# Patient Record
Sex: FEMALE
[De-identification: names, ages, dates, MRNs, and addresses within clinical notes are randomized per-mention and may not be internally consistent; named-entity substitution may affect disease eponyms.]

---

## 2020-12-07 PROBLEM — Z00.00 ENCOUNTER FOR PREVENTIVE HEALTH EXAMINATION: Status: ACTIVE | Noted: 2020-12-07

## 2020-12-08 ENCOUNTER — APPOINTMENT (OUTPATIENT)
Dept: OTOLARYNGOLOGY | Facility: CLINIC | Age: 70
End: 2020-12-08
Payer: MEDICARE

## 2020-12-08 VITALS
SYSTOLIC BLOOD PRESSURE: 159 MMHG | HEIGHT: 63 IN | WEIGHT: 180 LBS | HEART RATE: 93 BPM | DIASTOLIC BLOOD PRESSURE: 89 MMHG | TEMPERATURE: 97.3 F | BODY MASS INDEX: 31.89 KG/M2

## 2020-12-08 PROCEDURE — 69210 REMOVE IMPACTED EAR WAX UNI: CPT

## 2020-12-08 PROCEDURE — 99072 ADDL SUPL MATRL&STAF TM PHE: CPT

## 2020-12-08 PROCEDURE — 99204 OFFICE O/P NEW MOD 45 MIN: CPT | Mod: 25

## 2020-12-08 NOTE — PHYSICAL EXAM
[de-identified] : Bilateral soft tissue ear canal stenosis, worse on left side, ceruminous debris medially on the left side, was only able to perform partial removal [de-identified] : Visible portion of bilateral tympanic membranes intact without effusion or retraction [Normal] : no rashes

## 2020-12-08 NOTE — REASON FOR VISIT
[Initial Evaluation] : an initial evaluation for [FreeTextEntry2] : Clogged ear, c/o of recurrent both ear infection L>R last L ear infection was in september/2020

## 2020-12-08 NOTE — CONSULT LETTER
[FreeTextEntry2] : Rafita Bauer, DO [FreeTextEntry1] : Dear Rafita,\par \par Thanks for referring Shyann Farris for evaluation of her bilateral ear canal stenosis.  Exam today shows bilateral soft tissue EAC stenosis, worse on the left side, with a 2 to 3 mm opening on the left side preventing removal of ceruminous debris accumulation in the medial ear canal.  I discussed with her that most likely in order to correct this problem she would require canalplasty with surgical removal of the soft tissue stenosis and skin grafting, but first I did offer her the option of attempting further medical management in the short-term.   Today after cleaning the left ear canal we applied topical betamethasone cream.   At her next follow-up visit I may also consider placement of a wick to stent the canal open further.  I have also occasionally found topical steroid powders to be helpful in maintaining patency of the canal, although in this circumstance the degree of stenosis may already be too severe.   We I will see her back in 1 week.\par \par Thank you once again for the opportunity to participate in your patient's care, and I will keep you informed as to his progress.\par \par Best regards,\par \par Baltazar Melton MD\par Otology/Neurotology\par [facility]\par [facility]

## 2020-12-08 NOTE — HISTORY OF PRESENT ILLNESS
[de-identified] : 70-year-old woman with long history of bilateral otitis externa presents with recurring clogged ear sensation with associated hearing loss, bilateral but worse in the left ear.  Some improvement with antibiotic/steroid drops.  Symptoms present over years, but more recently worsened in past year.  Not diabetic, no problems with eczema or psoriasis in other parts of the body.

## 2020-12-08 NOTE — PROCEDURE
[FreeTextEntry3] : Procedure note:  Bilateral cerumenectomy\par \par Description of Procedure:  Cerumen impaction was noted requiring debridement under the operating microscope using otologic instrumentation.  This procedure was repeated in the contralateral ear.  The patient tolerated the procedure without complications.

## 2020-12-08 NOTE — DATA REVIEWED
[de-identified] : I personally reviewed temporal bone CT images and the report, which shows soft tissue thickening of bilateral ear canal epithelium, bilateral middle ear and mastoid clear.

## 2020-12-18 ENCOUNTER — APPOINTMENT (OUTPATIENT)
Dept: OTOLARYNGOLOGY | Facility: CLINIC | Age: 70
End: 2020-12-18
Payer: MEDICARE

## 2020-12-18 ENCOUNTER — TRANSCRIPTION ENCOUNTER (OUTPATIENT)
Age: 70
End: 2020-12-18

## 2020-12-18 VITALS
HEIGHT: 63 IN | WEIGHT: 180 LBS | DIASTOLIC BLOOD PRESSURE: 96 MMHG | SYSTOLIC BLOOD PRESSURE: 170 MMHG | BODY MASS INDEX: 31.89 KG/M2 | HEART RATE: 87 BPM

## 2020-12-18 PROCEDURE — 99213 OFFICE O/P EST LOW 20 MIN: CPT | Mod: 25

## 2020-12-18 PROCEDURE — 99072 ADDL SUPL MATRL&STAF TM PHE: CPT

## 2020-12-18 PROCEDURE — 69210 REMOVE IMPACTED EAR WAX UNI: CPT

## 2020-12-18 NOTE — REASON FOR VISIT
[Subsequent Evaluation] : a subsequent evaluation for [FreeTextEntry2] : follow up clogged ear, c/o of recurrent both ear infection L>R last L ear infection was in september/2020.

## 2020-12-18 NOTE — HISTORY OF PRESENT ILLNESS
[de-identified] : 70-year-old woman follow up for clogged ears. Reports has itch in left ear for 2 days. Denies otalgia, otorrhea, ear infections, hearing loss, tinnitus, dizziness, vertigo, headaches related to hearing.

## 2020-12-18 NOTE — PROCEDURE
[FreeTextEntry3] : Procedure note:  Left cerumenectomy\par \par Description of Procedure:  Cerumen impaction was noted requiring debridement under the operating microscope using otologic instrumentation.  The patient tolerated the procedure without complications.\par

## 2020-12-18 NOTE — PHYSICAL EXAM
[de-identified] : Bilateral soft tissue ear canal stenosis, worse on left side, slightly improved compared to last evaluation ceruminous debris medially on the left side [de-identified] : Visible portion of bilateral tympanic membranes intact without effusion or retraction

## 2021-01-22 ENCOUNTER — APPOINTMENT (OUTPATIENT)
Dept: OTOLARYNGOLOGY | Facility: CLINIC | Age: 71
End: 2021-01-22
Payer: MEDICARE

## 2021-01-22 PROCEDURE — 99072 ADDL SUPL MATRL&STAF TM PHE: CPT

## 2021-01-22 PROCEDURE — 99213 OFFICE O/P EST LOW 20 MIN: CPT | Mod: 25

## 2021-01-22 PROCEDURE — 69210 REMOVE IMPACTED EAR WAX UNI: CPT

## 2021-01-23 NOTE — HISTORY OF PRESENT ILLNESS
[de-identified] : f/u Left OE with stenosis of EAC\par betamethasone and cotton placed last visit\par here for possible wick placement today\par left ear feels clogged, wet

## 2021-01-23 NOTE — CONSULT LETTER
[FreeTextEntry2] : Rafita Bauer, DO [FreeTextEntry1] : Dear Rafita,\par \par Shyann Farris presents for follow-up for her bilateral ear canal stenosis.  Since her last visit with me slightly more than 1 month ago, she has not had significant drainage following application of topical steroid powderw and betamethasone cream.  Her exam today shows persistent bilateral soft tissue stenosis, greater on the left side, but overall improved relative to her initial evaluation.  I recommend we attempt dilation of the stenosis on the left side with an ear wick, and this was performed today.  She will follow-up in 1 week for weight removal, ear cleaning, and replacement.  As previously discussed, I agree that she would benefit from surgical resection of the soft tissue stenosis, but she would prefer to avoid surgery if at all possible so we will continue with these conservative measures in the short-term.\par \par Thank you once again for the opportunity to participate in your patient's care, and I will keep you informed as to her progress.\par \par Best regards,\par \par Baltazar Melton MD\par Otology/Neurotology\par [facility]\par [facility]

## 2021-01-23 NOTE — PHYSICAL EXAM
[de-identified] : Bilateral soft tissue ear canal stenosis, worse on left side, slightly improved compared to last evaluation, ceruminous debris medially on the left side [de-identified] : Visible portion of bilateral tympanic membranes intact without effusion or retraction

## 2021-01-29 ENCOUNTER — APPOINTMENT (OUTPATIENT)
Dept: OTOLARYNGOLOGY | Facility: CLINIC | Age: 71
End: 2021-01-29
Payer: MEDICARE

## 2021-01-29 PROCEDURE — 99072 ADDL SUPL MATRL&STAF TM PHE: CPT

## 2021-01-29 PROCEDURE — 92504 EAR MICROSCOPY EXAMINATION: CPT

## 2021-01-29 PROCEDURE — 99213 OFFICE O/P EST LOW 20 MIN: CPT | Mod: 25

## 2021-01-30 NOTE — CONSULT LETTER
[FreeTextEntry2] : Rafita Bauer, DO [FreeTextEntry1] : Dear Rafita,\par \par Shyann Farris presents for follow-up for her bilateral ear canal stenosis.  At her last visit she underwent left ear canal dilation with a wick.  Exam today shows patent bilateral ear canals which are dry and without ceruminous debris accumulation.  There is an approximately 4 mm opening bilaterally.  We reapplied topical steroid powder to both ear canals today.  At this point she is pleased with the absence of drainage and short-term hearing improvement.  I would still like to monitor her closely, and we discussed that stenoses tend to recur and worsen over time.  For now we will see her back in 6 weeks.\par \par Thank you once again for the opportunity to participate in your patient's care, and I will keep you informed as to her progress.\par \par Best regards,\par \par Baltazar Melton MD\par Otology/Neurotology\par [facility]\par [facility]

## 2021-01-30 NOTE — HISTORY OF PRESENT ILLNESS
[de-identified] : 70 year female follow up b/l OE. Wick placement in left year last visit, reports fell out last night. Reports right ear still feels clogged, left ear feels better. Denies otalgia, otorrhea, ear infections, hearing loss.

## 2021-01-30 NOTE — PROCEDURE
[FreeTextEntry3] : Procedure note:  Binocular microscopy\par \par Inspection of the ears was performed under binocular microscopy because of need to accurately visualize otologic landmarks and potential pathologic conditions that would not otherwise be visible through simple otoscopic exam.
N/A

## 2021-02-26 ENCOUNTER — APPOINTMENT (OUTPATIENT)
Dept: OTOLARYNGOLOGY | Facility: CLINIC | Age: 71
End: 2021-02-26
Payer: MEDICARE

## 2021-02-26 VITALS — SYSTOLIC BLOOD PRESSURE: 150 MMHG | HEART RATE: 89 BPM | DIASTOLIC BLOOD PRESSURE: 88 MMHG | HEIGHT: 63 IN

## 2021-02-26 DIAGNOSIS — H93.293 OTHER ABNORMAL AUDITORY PERCEPTIONS, BILATERAL: ICD-10-CM

## 2021-02-26 DIAGNOSIS — H61.21 IMPACTED CERUMEN, RIGHT EAR: ICD-10-CM

## 2021-02-26 DIAGNOSIS — H61.309 ACQUIRED STENOSIS OF EXTERNAL EAR CANAL, UNSPECIFIED, UNSPECIFIED EAR: ICD-10-CM

## 2021-02-26 DIAGNOSIS — H61.22 IMPACTED CERUMEN, LEFT EAR: ICD-10-CM

## 2021-02-26 PROCEDURE — 99072 ADDL SUPL MATRL&STAF TM PHE: CPT

## 2021-02-26 PROCEDURE — 69210 REMOVE IMPACTED EAR WAX UNI: CPT

## 2021-02-26 PROCEDURE — 99213 OFFICE O/P EST LOW 20 MIN: CPT | Mod: 25

## 2021-02-26 RX ORDER — AMLODIPINE BESYLATE 5 MG/1
TABLET ORAL
Refills: 0 | Status: ACTIVE | COMMUNITY

## 2021-02-26 RX ORDER — OFLOXACIN OTIC 3 MG/ML
0.3 SOLUTION AURICULAR (OTIC)
Qty: 1 | Refills: 1 | Status: COMPLETED | COMMUNITY
Start: 2021-01-22 | End: 2021-02-26

## 2021-02-26 NOTE — HISTORY OF PRESENT ILLNESS
[de-identified] : 70 year female follow up b/l OE. Topical steroid applied in both ears last visit. States ears feel much better. Hearing has improved. Denies otalgia, otorrhea, ear infections.

## 2021-04-23 ENCOUNTER — APPOINTMENT (OUTPATIENT)
Dept: OTOLARYNGOLOGY | Facility: CLINIC | Age: 71
End: 2021-04-23

## 2023-08-28 ENCOUNTER — HOSPITAL ENCOUNTER (EMERGENCY)
Age: 73
Discharge: HOME | End: 2023-08-29
Attending: EMERGENCY MEDICINE
Payer: MEDICARE

## 2023-08-28 ENCOUNTER — APPOINTMENT (OUTPATIENT)
Dept: GENERAL RADIOLOGY | Age: 73
End: 2023-08-28
Payer: MEDICARE

## 2023-08-28 DIAGNOSIS — S73.005A CLOSED DISLOCATION OF LEFT HIP, INITIAL ENCOUNTER (HCC): Primary | ICD-10-CM

## 2023-08-28 PROCEDURE — 96375 TX/PRO/DX INJ NEW DRUG ADDON: CPT

## 2023-08-28 PROCEDURE — 99285 EMERGENCY DEPT VISIT HI MDM: CPT

## 2023-08-28 PROCEDURE — 96374 THER/PROPH/DIAG INJ IV PUSH: CPT

## 2023-08-28 PROCEDURE — 96376 TX/PRO/DX INJ SAME DRUG ADON: CPT

## 2023-08-28 PROCEDURE — 73502 X-RAY EXAM HIP UNI 2-3 VIEWS: CPT

## 2023-08-28 ASSESSMENT — PAIN - FUNCTIONAL ASSESSMENT: PAIN_FUNCTIONAL_ASSESSMENT: 0-10

## 2023-08-28 ASSESSMENT — PAIN DESCRIPTION - ORIENTATION: ORIENTATION: LEFT

## 2023-08-28 ASSESSMENT — PAIN DESCRIPTION - LOCATION: LOCATION: HIP

## 2023-08-28 ASSESSMENT — PAIN SCALES - GENERAL: PAINLEVEL_OUTOF10: 9

## 2023-08-29 ENCOUNTER — APPOINTMENT (OUTPATIENT)
Dept: GENERAL RADIOLOGY | Age: 73
End: 2023-08-29
Payer: MEDICARE

## 2023-08-29 ENCOUNTER — ANESTHESIA (OUTPATIENT)
Dept: SURGERY | Age: 73
End: 2023-08-29
Payer: MEDICARE

## 2023-08-29 ENCOUNTER — ANESTHESIA EVENT (OUTPATIENT)
Dept: SURGERY | Age: 73
End: 2023-08-29
Payer: MEDICARE

## 2023-08-29 VITALS
HEIGHT: 63 IN | RESPIRATION RATE: 14 BRPM | SYSTOLIC BLOOD PRESSURE: 179 MMHG | WEIGHT: 191 LBS | DIASTOLIC BLOOD PRESSURE: 75 MMHG | TEMPERATURE: 97.6 F | HEART RATE: 68 BPM | BODY MASS INDEX: 33.84 KG/M2 | OXYGEN SATURATION: 95 %

## 2023-08-29 PROCEDURE — 2500000003 HC RX 250 WO HCPCS: Performed by: NURSE ANESTHETIST, CERTIFIED REGISTERED

## 2023-08-29 PROCEDURE — 7100000011 HC PHASE II RECOVERY - ADDTL 15 MIN: Performed by: ORTHOPAEDIC SURGERY

## 2023-08-29 PROCEDURE — 96376 TX/PRO/DX INJ SAME DRUG ADON: CPT

## 2023-08-29 PROCEDURE — 3700000001 HC ADD 15 MINUTES (ANESTHESIA): Performed by: ORTHOPAEDIC SURGERY

## 2023-08-29 PROCEDURE — 7100000001 HC PACU RECOVERY - ADDTL 15 MIN: Performed by: ORTHOPAEDIC SURGERY

## 2023-08-29 PROCEDURE — 73502 X-RAY EXAM HIP UNI 2-3 VIEWS: CPT

## 2023-08-29 PROCEDURE — L1830 KO IMMOB CANVAS LONG PRE OTS: HCPCS | Performed by: ORTHOPAEDIC SURGERY

## 2023-08-29 PROCEDURE — 73501 X-RAY EXAM HIP UNI 1 VIEW: CPT

## 2023-08-29 PROCEDURE — 7100000000 HC PACU RECOVERY - FIRST 15 MIN: Performed by: ORTHOPAEDIC SURGERY

## 2023-08-29 PROCEDURE — 6360000002 HC RX W HCPCS: Performed by: NURSE ANESTHETIST, CERTIFIED REGISTERED

## 2023-08-29 PROCEDURE — 6360000002 HC RX W HCPCS: Performed by: EMERGENCY MEDICINE

## 2023-08-29 PROCEDURE — 3600000012 HC SURGERY LEVEL 2 ADDTL 15MIN: Performed by: ORTHOPAEDIC SURGERY

## 2023-08-29 PROCEDURE — 96375 TX/PRO/DX INJ NEW DRUG ADDON: CPT

## 2023-08-29 PROCEDURE — 7100000010 HC PHASE II RECOVERY - FIRST 15 MIN: Performed by: ORTHOPAEDIC SURGERY

## 2023-08-29 PROCEDURE — 3700000000 HC ANESTHESIA ATTENDED CARE: Performed by: ORTHOPAEDIC SURGERY

## 2023-08-29 PROCEDURE — 2580000003 HC RX 258: Performed by: NURSE ANESTHETIST, CERTIFIED REGISTERED

## 2023-08-29 PROCEDURE — 3600000002 HC SURGERY LEVEL 2 BASE: Performed by: ORTHOPAEDIC SURGERY

## 2023-08-29 PROCEDURE — 96374 THER/PROPH/DIAG INJ IV PUSH: CPT

## 2023-08-29 PROCEDURE — 6370000000 HC RX 637 (ALT 250 FOR IP): Performed by: ANESTHESIOLOGY

## 2023-08-29 RX ORDER — HYDROCODONE BITARTRATE AND ACETAMINOPHEN 7.5; 325 MG/1; MG/1
1 TABLET ORAL EVERY 6 HOURS PRN
Qty: 20 TABLET | Refills: 0 | Status: SHIPPED | OUTPATIENT
Start: 2023-08-29 | End: 2023-09-03

## 2023-08-29 RX ORDER — MORPHINE SULFATE 4 MG/ML
4 INJECTION INTRAVENOUS ONCE
Status: COMPLETED | OUTPATIENT
Start: 2023-08-29 | End: 2023-08-29

## 2023-08-29 RX ORDER — METOPROLOL SUCCINATE 25 MG/1
25 TABLET, EXTENDED RELEASE ORAL DAILY
Qty: 30 TABLET | Refills: 5 | COMMUNITY
Start: 2023-08-01 | End: 2024-01-28

## 2023-08-29 RX ORDER — METOPROLOL SUCCINATE 25 MG/1
25 TABLET, EXTENDED RELEASE ORAL ONCE
Status: COMPLETED | OUTPATIENT
Start: 2023-08-29 | End: 2023-08-29

## 2023-08-29 RX ORDER — LIDOCAINE HYDROCHLORIDE 20 MG/ML
INJECTION, SOLUTION EPIDURAL; INFILTRATION; INTRACAUDAL; PERINEURAL PRN
Status: DISCONTINUED | OUTPATIENT
Start: 2023-08-29 | End: 2023-08-29 | Stop reason: SDUPTHER

## 2023-08-29 RX ORDER — MELOXICAM 15 MG/1
15 TABLET ORAL EVERY MORNING
COMMUNITY
Start: 2023-07-24

## 2023-08-29 RX ORDER — SODIUM CHLORIDE, SODIUM LACTATE, POTASSIUM CHLORIDE, CALCIUM CHLORIDE 600; 310; 30; 20 MG/100ML; MG/100ML; MG/100ML; MG/100ML
INJECTION, SOLUTION INTRAVENOUS CONTINUOUS PRN
Status: DISCONTINUED | OUTPATIENT
Start: 2023-08-29 | End: 2023-08-29 | Stop reason: SDUPTHER

## 2023-08-29 RX ORDER — LEFLUNOMIDE 20 MG/1
20 TABLET ORAL EVERY MORNING
COMMUNITY
Start: 2023-08-16

## 2023-08-29 RX ORDER — MORPHINE SULFATE 4 MG/ML
4 INJECTION INTRAVENOUS
Status: DISCONTINUED | OUTPATIENT
Start: 2023-08-29 | End: 2023-08-29 | Stop reason: HOSPADM

## 2023-08-29 RX ORDER — ONDANSETRON 2 MG/ML
4 INJECTION INTRAMUSCULAR; INTRAVENOUS
Status: COMPLETED | OUTPATIENT
Start: 2023-08-29 | End: 2023-08-29

## 2023-08-29 RX ORDER — AMLODIPINE AND OLMESARTAN MEDOXOMIL 10; 40 MG/1; MG/1
1 TABLET ORAL EVERY MORNING
COMMUNITY
Start: 2023-06-07

## 2023-08-29 RX ORDER — PROPOFOL 10 MG/ML
INJECTION, EMULSION INTRAVENOUS PRN
Status: DISCONTINUED | OUTPATIENT
Start: 2023-08-29 | End: 2023-08-29 | Stop reason: SDUPTHER

## 2023-08-29 RX ADMIN — MORPHINE SULFATE 4 MG: 4 INJECTION INTRAVENOUS at 01:25

## 2023-08-29 RX ADMIN — MORPHINE SULFATE 4 MG: 4 INJECTION INTRAVENOUS at 03:01

## 2023-08-29 RX ADMIN — PROPOFOL 30 MG: 10 INJECTION, EMULSION INTRAVENOUS at 07:28

## 2023-08-29 RX ADMIN — PROPOFOL 20 MG: 10 INJECTION, EMULSION INTRAVENOUS at 07:36

## 2023-08-29 RX ADMIN — PROPOFOL 30 MG: 10 INJECTION, EMULSION INTRAVENOUS at 07:37

## 2023-08-29 RX ADMIN — PROPOFOL 20 MG: 10 INJECTION, EMULSION INTRAVENOUS at 07:27

## 2023-08-29 RX ADMIN — ONDANSETRON 4 MG: 2 INJECTION INTRAMUSCULAR; INTRAVENOUS at 01:25

## 2023-08-29 RX ADMIN — PROPOFOL 30 MG: 10 INJECTION, EMULSION INTRAVENOUS at 07:26

## 2023-08-29 RX ADMIN — LIDOCAINE HYDROCHLORIDE 60 MG: 20 INJECTION, SOLUTION EPIDURAL; INFILTRATION; INTRACAUDAL; PERINEURAL at 07:26

## 2023-08-29 RX ADMIN — PROPOFOL 20 MG: 10 INJECTION, EMULSION INTRAVENOUS at 07:34

## 2023-08-29 RX ADMIN — SODIUM CHLORIDE, SODIUM LACTATE, POTASSIUM CHLORIDE, AND CALCIUM CHLORIDE: 600; 310; 30; 20 INJECTION, SOLUTION INTRAVENOUS at 07:19

## 2023-08-29 RX ADMIN — PROPOFOL 86.2 MG: 10 INJECTION, EMULSION INTRAVENOUS at 01:33

## 2023-08-29 RX ADMIN — METOPROLOL SUCCINATE 25 MG: 25 TABLET, EXTENDED RELEASE ORAL at 07:13

## 2023-08-29 ASSESSMENT — PAIN SCALES - GENERAL
PAINLEVEL_OUTOF10: 8
PAINLEVEL_OUTOF10: 5

## 2023-08-29 ASSESSMENT — LIFESTYLE VARIABLES
HOW OFTEN DO YOU HAVE A DRINK CONTAINING ALCOHOL: NEVER
HOW MANY STANDARD DRINKS CONTAINING ALCOHOL DO YOU HAVE ON A TYPICAL DAY: PATIENT DOES NOT DRINK

## 2023-08-29 ASSESSMENT — PAIN DESCRIPTION - DESCRIPTORS: DESCRIPTORS: BURNING;ACHING;THROBBING

## 2023-08-29 ASSESSMENT — PAIN - FUNCTIONAL ASSESSMENT: PAIN_FUNCTIONAL_ASSESSMENT: 0-10

## 2023-08-29 NOTE — ED TRIAGE NOTES
Pt arrives via ems from hotel visiting the area c/o L hip pain. Per ems pt had L hip replacement this past June. Tonight she was bending down when she felt extreme pain in that hip along with a \"warm, gushing sensation. \" NAD this time.

## 2023-08-29 NOTE — OP NOTE
of in a superior direction so I pulled it about a 45 degree angle with about 45 degrees of hip flexion and as I did this I felt and heard and palpable clunk. We then brought the C-arm image intensifier in and confirmed that the arthroplasty was in excellent position. It appeared that the femoral head was well seated within the acetabular component. She was then placed in a knee immobilizer and taken to the recovery room. We will get plain films in the recovery room just to document for sure that this is in place      Estimated Blood Loss: 0 cc    Tourniquet Time: * No tourniquets in log *      Implants: * No implants in log *            Specimens: * No specimens in log *        Drains: None                Complications: None    Counts: Sponge and needle counts were correct times two.     Signed By:  Ulysses Cespedes MD     August 29, 2023

## 2023-08-29 NOTE — PERIOP NOTE
Pt and daughter Jamee Boss given discharge instructions at bedside, both verbalize understanding. All questions answered.

## 2023-08-29 NOTE — ED PROVIDER NOTES
pulse oximetry, frequent LOC assessments and frequent vital sign checks    Intra-procedure events: none      Sedation end time:  8/29/2023 1:41 AM  Post-procedure details:     Post-sedation assessment completed:  8/29/2023 1:41 AM    Attendance: Constant attendance by certified staff until patient recovered      Recovery: Patient returned to pre-procedure baseline      Estimated blood loss (see I/O flowsheets): no      Complications:  None    Post-sedation assessments completed and reviewed: airway patency, cardiovascular function, mental status and respiratory function      Specimens recovered:  None    Patient is stable for discharge or admission: no    Comments:      Unsuccessful reduction of left prosthetic hip dislocation    Orders Placed This Encounter   Procedures    XR HIP 2-3 VW W PELVIS LEFT    XR HIP LEFT (1 VIEW)    Procedure Consent    Procedural sedation        Medications given during this emergency department visit:  Medications   morphine injection 4 mg (4 mg IntraVENous Given 8/29/23 0125)   ondansetron (ZOFRAN) injection 4 mg (4 mg IntraVENous Given 8/29/23 0125)   propofol bolus 86.2 mg (86.2 mg IntraVENous Given by Other 8/29/23 0133)       New Prescriptions    No medications on file        No past medical history on file. No past surgical history on file. Social History     Socioeconomic History    Marital status:         Previous Medications    No medications on file        Results for orders placed or performed during the hospital encounter of 08/28/23   XR HIP 2-3 VW W PELVIS LEFT    Narrative    Examination: XR HIP 2-3 VW W PELVIS LEFT    Indication: Pain    Comparison: No prior study is available for comparison. Findings:  The patient is status post bilateral total hip arthroplasty. There is superior   dislocation of the left femoral head component relative to the acetabular   component. No acute fracture is identified.       Impression    Impression:  Superior

## 2023-08-29 NOTE — DISCHARGE INSTRUCTIONS
have sleep apnea and have a CPAP machine, please use it for all naps and sleeping. Please use caution when taking narcotics and any of your home medications that may cause drowsiness. *  Please give a list of your current medications to your Primary Care Provider. *  Please update this list whenever your medications are discontinued, doses are      changed, or new medications (including over-the-counter products) are added. *  Please carry medication information at all times in case of emergency situations. These are general instructions for a healthy lifestyle:  No smoking/ No tobacco products/ Avoid exposure to second hand smoke  Surgeon General's Warning:  Quitting smoking now greatly reduces serious risk to your health. Obesity, smoking, and sedentary lifestyle greatly increases your risk for illness  A healthy diet, regular physical exercise & weight monitoring are important for maintaining a healthy lifestyle    You may be retaining fluid if you have a history of heart failure or if you experience any of the following symptoms:  Weight gain of 3 pounds or more overnight or 5 pounds in a week, increased swelling in our hands or feet or shortness of breath while lying flat in bed. Please call your doctor as soon as you notice any of these symptoms; do not wait until your next office visit.

## 2023-08-29 NOTE — ANESTHESIA PRE PROCEDURE
Deferred   Vascular: negative vascular ROS. Other Findings:           Anesthesia Plan      general     ASA 2       Induction: intravenous. Anesthetic plan and risks discussed with patient.                         Ranjit Malhotra MD   8/29/2023

## 2024-06-04 ENCOUNTER — EMERGENCY (EMERGENCY)
Facility: HOSPITAL | Age: 74
LOS: 1 days | Discharge: HOME | End: 2024-06-04
Attending: EMERGENCY MEDICINE
Payer: MEDICARE

## 2024-06-04 VITALS
HEART RATE: 70 BPM | RESPIRATION RATE: 18 BRPM | OXYGEN SATURATION: 95 % | TEMPERATURE: 98 F | SYSTOLIC BLOOD PRESSURE: 160 MMHG | DIASTOLIC BLOOD PRESSURE: 74 MMHG

## 2024-06-04 VITALS
DIASTOLIC BLOOD PRESSURE: 87 MMHG | OXYGEN SATURATION: 96 % | TEMPERATURE: 99 F | RESPIRATION RATE: 18 BRPM | HEART RATE: 80 BPM | SYSTOLIC BLOOD PRESSURE: 156 MMHG

## 2024-06-04 LAB
ALBUMIN SERPL ELPH-MCNC: 3.7 G/DL — SIGNIFICANT CHANGE UP (ref 3.3–5)
ALP SERPL-CCNC: 121 U/L — HIGH (ref 40–120)
ALT FLD-CCNC: 13 U/L — SIGNIFICANT CHANGE UP (ref 10–45)
ANION GAP SERPL CALC-SCNC: 11 MMOL/L — SIGNIFICANT CHANGE UP (ref 5–17)
APTT BLD: 31.2 SEC — SIGNIFICANT CHANGE UP (ref 24.5–35.6)
AST SERPL-CCNC: 11 U/L — SIGNIFICANT CHANGE UP (ref 10–40)
BASE EXCESS BLDV CALC-SCNC: -1.1 MMOL/L — SIGNIFICANT CHANGE UP (ref -2–3)
BASOPHILS # BLD AUTO: 0.04 K/UL — SIGNIFICANT CHANGE UP (ref 0–0.2)
BASOPHILS NFR BLD AUTO: 0.7 % — SIGNIFICANT CHANGE UP (ref 0–2)
BILIRUB SERPL-MCNC: 0.3 MG/DL — SIGNIFICANT CHANGE UP (ref 0.2–1.2)
BUN SERPL-MCNC: 13 MG/DL — SIGNIFICANT CHANGE UP (ref 7–23)
CA-I SERPL-SCNC: 1.22 MMOL/L — SIGNIFICANT CHANGE UP (ref 1.15–1.33)
CALCIUM SERPL-MCNC: 8.8 MG/DL — SIGNIFICANT CHANGE UP (ref 8.4–10.5)
CHLORIDE BLDV-SCNC: 106 MMOL/L — SIGNIFICANT CHANGE UP (ref 96–108)
CHLORIDE SERPL-SCNC: 108 MMOL/L — SIGNIFICANT CHANGE UP (ref 96–108)
CO2 BLDV-SCNC: 26 MMOL/L — SIGNIFICANT CHANGE UP (ref 22–26)
CO2 SERPL-SCNC: 22 MMOL/L — SIGNIFICANT CHANGE UP (ref 22–31)
CREAT SERPL-MCNC: 0.88 MG/DL — SIGNIFICANT CHANGE UP (ref 0.5–1.3)
EGFR: 70 ML/MIN/1.73M2 — SIGNIFICANT CHANGE UP
EOSINOPHIL # BLD AUTO: 0.22 K/UL — SIGNIFICANT CHANGE UP (ref 0–0.5)
EOSINOPHIL NFR BLD AUTO: 3.9 % — SIGNIFICANT CHANGE UP (ref 0–6)
GAS PNL BLDV: 136 MMOL/L — SIGNIFICANT CHANGE UP (ref 136–145)
GAS PNL BLDV: SIGNIFICANT CHANGE UP
GAS PNL BLDV: SIGNIFICANT CHANGE UP
GLUCOSE BLDV-MCNC: 92 MG/DL — SIGNIFICANT CHANGE UP (ref 70–99)
GLUCOSE SERPL-MCNC: 94 MG/DL — SIGNIFICANT CHANGE UP (ref 70–99)
HCO3 BLDV-SCNC: 24 MMOL/L — SIGNIFICANT CHANGE UP (ref 22–29)
HCT VFR BLD CALC: 37.8 % — SIGNIFICANT CHANGE UP (ref 34.5–45)
HCT VFR BLDA CALC: 38 % — SIGNIFICANT CHANGE UP (ref 34.5–46.5)
HGB BLD CALC-MCNC: 12.8 G/DL — SIGNIFICANT CHANGE UP (ref 11.7–16.1)
HGB BLD-MCNC: 12.1 G/DL — SIGNIFICANT CHANGE UP (ref 11.5–15.5)
IMM GRANULOCYTES NFR BLD AUTO: 0.4 % — SIGNIFICANT CHANGE UP (ref 0–0.9)
INR BLD: 1.16 RATIO — SIGNIFICANT CHANGE UP (ref 0.85–1.18)
LACTATE BLDV-MCNC: 1.1 MMOL/L — SIGNIFICANT CHANGE UP (ref 0.5–2)
LYMPHOCYTES # BLD AUTO: 1.75 K/UL — SIGNIFICANT CHANGE UP (ref 1–3.3)
LYMPHOCYTES # BLD AUTO: 31.3 % — SIGNIFICANT CHANGE UP (ref 13–44)
MCHC RBC-ENTMCNC: 28.1 PG — SIGNIFICANT CHANGE UP (ref 27–34)
MCHC RBC-ENTMCNC: 32 GM/DL — SIGNIFICANT CHANGE UP (ref 32–36)
MCV RBC AUTO: 87.9 FL — SIGNIFICANT CHANGE UP (ref 80–100)
MONOCYTES # BLD AUTO: 0.64 K/UL — SIGNIFICANT CHANGE UP (ref 0–0.9)
MONOCYTES NFR BLD AUTO: 11.4 % — SIGNIFICANT CHANGE UP (ref 2–14)
NEUTROPHILS # BLD AUTO: 2.92 K/UL — SIGNIFICANT CHANGE UP (ref 1.8–7.4)
NEUTROPHILS NFR BLD AUTO: 52.3 % — SIGNIFICANT CHANGE UP (ref 43–77)
NRBC # BLD: 0 /100 WBCS — SIGNIFICANT CHANGE UP (ref 0–0)
PCO2 BLDV: 42 MMHG — SIGNIFICANT CHANGE UP (ref 39–42)
PH BLDV: 7.37 — SIGNIFICANT CHANGE UP (ref 7.32–7.43)
PLATELET # BLD AUTO: 311 K/UL — SIGNIFICANT CHANGE UP (ref 150–400)
PO2 BLDV: 34 MMHG — SIGNIFICANT CHANGE UP (ref 25–45)
POTASSIUM BLDV-SCNC: 4.8 MMOL/L — SIGNIFICANT CHANGE UP (ref 3.5–5.1)
POTASSIUM SERPL-MCNC: 4 MMOL/L — SIGNIFICANT CHANGE UP (ref 3.5–5.3)
POTASSIUM SERPL-SCNC: 4 MMOL/L — SIGNIFICANT CHANGE UP (ref 3.5–5.3)
PROT SERPL-MCNC: 6.7 G/DL — SIGNIFICANT CHANGE UP (ref 6–8.3)
PROTHROM AB SERPL-ACNC: 12.1 SEC — SIGNIFICANT CHANGE UP (ref 9.5–13)
RBC # BLD: 4.3 M/UL — SIGNIFICANT CHANGE UP (ref 3.8–5.2)
RBC # FLD: 13.1 % — SIGNIFICANT CHANGE UP (ref 10.3–14.5)
SAO2 % BLDV: 58.8 % — LOW (ref 67–88)
SODIUM SERPL-SCNC: 141 MMOL/L — SIGNIFICANT CHANGE UP (ref 135–145)
WBC # BLD: 5.59 K/UL — SIGNIFICANT CHANGE UP (ref 3.8–10.5)
WBC # FLD AUTO: 5.59 K/UL — SIGNIFICANT CHANGE UP (ref 3.8–10.5)

## 2024-06-04 PROCEDURE — 84295 ASSAY OF SERUM SODIUM: CPT

## 2024-06-04 PROCEDURE — 74177 CT ABD & PELVIS W/CONTRAST: CPT | Mod: 26,MC

## 2024-06-04 PROCEDURE — 99285 EMERGENCY DEPT VISIT HI MDM: CPT | Mod: 25

## 2024-06-04 PROCEDURE — 96376 TX/PRO/DX INJ SAME DRUG ADON: CPT | Mod: XU

## 2024-06-04 PROCEDURE — 85025 COMPLETE CBC W/AUTO DIFF WBC: CPT

## 2024-06-04 PROCEDURE — 76705 ECHO EXAM OF ABDOMEN: CPT

## 2024-06-04 PROCEDURE — 10060 I&D ABSCESS SIMPLE/SINGLE: CPT

## 2024-06-04 PROCEDURE — 85610 PROTHROMBIN TIME: CPT

## 2024-06-04 PROCEDURE — 84132 ASSAY OF SERUM POTASSIUM: CPT

## 2024-06-04 PROCEDURE — 85730 THROMBOPLASTIN TIME PARTIAL: CPT

## 2024-06-04 PROCEDURE — 85014 HEMATOCRIT: CPT

## 2024-06-04 PROCEDURE — 82330 ASSAY OF CALCIUM: CPT

## 2024-06-04 PROCEDURE — 82435 ASSAY OF BLOOD CHLORIDE: CPT

## 2024-06-04 PROCEDURE — 80053 COMPREHEN METABOLIC PANEL: CPT

## 2024-06-04 PROCEDURE — 85018 HEMOGLOBIN: CPT

## 2024-06-04 PROCEDURE — 82803 BLOOD GASES ANY COMBINATION: CPT

## 2024-06-04 PROCEDURE — 76705 ECHO EXAM OF ABDOMEN: CPT | Mod: 26

## 2024-06-04 PROCEDURE — 82947 ASSAY GLUCOSE BLOOD QUANT: CPT

## 2024-06-04 PROCEDURE — 83605 ASSAY OF LACTIC ACID: CPT

## 2024-06-04 PROCEDURE — 36415 COLL VENOUS BLD VENIPUNCTURE: CPT

## 2024-06-04 PROCEDURE — 87040 BLOOD CULTURE FOR BACTERIA: CPT

## 2024-06-04 PROCEDURE — 96374 THER/PROPH/DIAG INJ IV PUSH: CPT | Mod: XU

## 2024-06-04 PROCEDURE — 93005 ELECTROCARDIOGRAM TRACING: CPT | Mod: XU

## 2024-06-04 PROCEDURE — 74177 CT ABD & PELVIS W/CONTRAST: CPT | Mod: MC

## 2024-06-04 RX ORDER — LIDOCAINE HCL 20 MG/ML
5 VIAL (ML) INJECTION ONCE
Refills: 0 | Status: COMPLETED | OUTPATIENT
Start: 2024-06-04 | End: 2024-06-04

## 2024-06-04 RX ORDER — ACETAMINOPHEN 500 MG
1000 TABLET ORAL ONCE
Refills: 0 | Status: COMPLETED | OUTPATIENT
Start: 2024-06-04 | End: 2024-06-04

## 2024-06-04 RX ADMIN — Medication 400 MILLIGRAM(S): at 13:58

## 2024-06-04 RX ADMIN — Medication 400 MILLIGRAM(S): at 22:42

## 2024-06-04 RX ADMIN — Medication 1 TABLET(S): at 13:58

## 2024-06-04 RX ADMIN — Medication 5 MILLILITER(S): at 20:33

## 2024-06-04 NOTE — ED PROVIDER NOTE - PATIENT PORTAL LINK FT
You can access the FollowMyHealth Patient Portal offered by [facility] by registering at the following website: [email or web address]. By joining [email or web address]’s FollowMyHealth portal, you will also be able to view your health information using other applications (apps) compatible with our system.

## 2024-06-04 NOTE — ED PROVIDER NOTE - PROGRESS NOTE DETAILS
DANIEL Christopher PGY1- abscess drained (see procedure note), bactrim sent to pharmacy. will give gen surg referral, dc home. patient agreeable with plan

## 2024-06-04 NOTE — ED PROVIDER NOTE - CLINICAL SUMMARY MEDICAL DECISION MAKING FREE TEXT BOX
73-year-old female past medical history of hypertension and presents to the ED for right-sided lower back lesion.  Patient states she noticed that 1 week ago.  She thought she got bit by a bug as it was itchy.  She went to [facility] 3 days ago and was prescribed doxycycline but states the lesion has gotten worse which is what prompted her to come in today.  She is also endorsing fatigue, nausea and chills since she noticed the lesion.  She also reports a decreased appetite.  She denies fevers, shortness of breath, headache, dizziness, abdominal pain or any urinary symptoms. 73-year-old female past medical history of hypertension and presents to the ED for right-sided lower back lesion.  Patient states she noticed that 1 week ago.  She thought she got bit by a bug as it was itchy.  She went to [facility] 3 days ago and was prescribed doxycycline but states the lesion has gotten worse which is what prompted her to come in today.  She is also endorsing fatigue, nausea and chills since she noticed the lesion.  She also reports a decreased appetite.  She denies fevers, shortness of breath, headache, dizziness, abdominal pain or any urinary symptoms.  concern for abscess vs cellulitis vs bug bite, will get US and possible CT to see depth of abscess. Will start abx given her current abx has not been helping her. labs, tylenol  and re-asses

## 2024-06-04 NOTE — ED PROVIDER NOTE - ATTENDING CONTRIBUTION TO CARE
Patient is a 73-year-old female with a history of hypertension here for evaluation of fatigue, chills and a bump on her right lower back.  Patient states that she has been fatigued for about 2 weeks.  She states that she thinks she got bit by a bug and the area was itchy.  She noticed a bump a few days ago and went to urgent care on Friday.  She was prescribed doxycycline which she did not start until Saturday.  Since then the area has become more red and tender.  She states that she has been nauseated.  She has not had any fevers.  While here in the emergency department the bump opened up and pus came out.  Patient has not taken any pain medication at home.      VS noted  Gen. no acute distress, Non toxic   HEENT: EOMI, mmm  Lungs: CTAB/L no C/ W /R   CVS: RRR   Abd; Soft non tender, non distended   back: Right lateral lower back there is a approximately 5 cm area of erythema and tenderness, upon squeezing there is some purulent discharge.  Area is warm and tender to palpation.  Ext: no edema  Skin: no rash  Neuro AAOx3 non focal clear speech  a/p:   Abscess/cellulitis–patient had spontaneous drainage of this abscess.  This has been getting worse while she has been on doxycycline.  Given systemic complaints of fatigue and chills, will send sepsis labs.  Will give Bactrim.  Will have ultrasound team ultrasound the area to see if there is more pus to be drained.  Leighton Finley MD

## 2024-06-04 NOTE — ED PROVIDER NOTE - PHYSICAL EXAMINATION
Physical Exam:  Gen:  Well appearing, awake, alert, non-toxic appearing  Head: NCAT  HEENT: Normal conjunctiva, trachea midline, moist mucous membranes  Lung: CTAB, no respiratory distress,speaking in full sentences  CV: RRR, no murmurs, rubs or gallops  Abd: Soft, non-tender, non-distended,   MSK: No visible deformities, moves all four extremities +tenderness R. lower back  Neuro: No focal motor deficits  Skin: Warm, well perfused, erythematous lesion right lower back   Psych: appropriate affect and mood

## 2024-06-04 NOTE — ED ADULT NURSE NOTE - OBJECTIVE STATEMENT
72yo F A&Ox4, pmhx HTN, presents to ED from home with c/o area of redness to back, nausea, chills, and generalized fatigue. pt reports she felt like she got bit by something approx 1 week ago to her R lower back/side, noticed a very small bump at the time. this bump has since worsened/grown in size, is painful to the touch, and is accompanied by nausea, fatigue, and chills. pt went to Urgent care 5 days ago and given a 7 day course of Doxycycline, currently on day 4 of the pills. reports the symptoms initially improved then began to worsen again. pt denies any fevers, abdominal pain, vomiting, diarrhea, 72yo F A&Ox4, pmhx HTN, presents to ED from home with c/o area of redness to back, nausea, chills, and generalized fatigue. pt reports she felt like she got bit by something approx 1 week ago to her R lower back/side, noticed a very small bump at the time. this bump has since worsened/grown in size, is painful to the touch, and is accompanied by nausea, fatigue, and chills.  pt went to Urgent care 5 days ago and given a 7 day course of Doxycycline for suspected cellulitis, pt currently on day 4 of the course. reports the symptoms initially improved then began to worsen again. pt states she believes she may have COVID because of the fatigue and chills, but also believes it may be attributed to her infection. pt denies any cough, congestion, chest pain, fevers, rash, abdominal pain, vomiting/diarrhea, urinary symptoms, or sick contacts. on exam pt is A&Ox4, breathing even and unlabored on room air, VSS, afebrile, + area of 74yo F A&Ox4, pmhx HTN, presents to ED from home with c/o area of redness to back, nausea, chills, and generalized fatigue. pt reports she felt like she got bit by something approx 1 week ago to her R lower back/side, noticed a very small bump at the time. this bump has since worsened/grown in size, is painful to the touch, and is accompanied by nausea, fatigue, and chills.  pt went to Urgent care 5 days ago and given a 7 day course of Doxycycline for suspected cellulitis, pt currently on day 4 of the course. reports the symptoms initially improved then began to worsen again. pt states she believes she may have COVID because of the fatigue and chills, but also believes it may be attributed to her infection. pt denies any cough, congestion, chest pain, sob, fevers, rash, abdominal pain, vomiting/diarrhea, urinary symptoms, or sick contacts. on exam pt is A&Ox4, breathing even and unlabored on room air, VSS, afebrile, abd soft nt nd. + area of erythema to R lower back with ttp and warmth to touch, + purulent discharge. pt seen and eval by ED MD. IV placed, labs drawn and sent. Patient undressed and placed into gown, call bell placed within reach, bed locked in lowest position, and appropriate side rails up for safety. plan of care discussed.

## 2024-06-04 NOTE — ED PROVIDER NOTE - NSFOLLOWUPINSTRUCTIONS_ED_ALL_ED_FT
Finish your course of doxycycline as prescribed.    We sent another antibiotic to your pharmacy (bactrim). Please also take this as prescribed.    Take tylenol as needed for pain.    You should follow up with general surgery for further evaluation of the abscess. We sent a referral and someone should contact you in 3-5 business days to schedule an appointment.     You should also follow up with your primary care provider within the next week.    Keep gauze over the area to allow it drain, replace the gauze as needed. Keep the area clean and dry.      Abscess    An abscess is an infected area that contains a collection of pus and debris. It can occur in almost any part of the body and occurs when the tissue gets infection. Symptoms include a painful mass that is red, warm, tender that might break open and HAVE drainage. If your health care provider gave you antibiotics make sure to take the full course and do not stop even if feeling better.     SEEK IMMEDIATE MEDICAL CARE IF YOU HAVE ANY OF THE FOLLOWING SYMPTOMS: chills, fever, muscle aches, or red streaking from the area.

## 2024-06-04 NOTE — ED PROVIDER NOTE - OBJECTIVE STATEMENT
73-year-old female past medical history of hypertension and presents to the ED for right-sided lower back lesion.  Patient states she noticed that 1 week ago.  She thought she got bit by a bug as it was itchy.  She went to [facility] 3 days ago and was prescribed doxycycline but states the lesion has gotten worse which is what prompted her to come in today.  She is also endorsing fatigue, nausea and chills since she noticed the lesion.  She also reports a decreased appetite.  She denies fevers, shortness of breath, headache, dizziness, abdominal pain or any urinary symptoms.

## 2024-06-04 NOTE — ED ADULT NURSE NOTE - NSFALLUNIVINTERV_ED_ALL_ED
Bed/Stretcher in lowest position, wheels locked, appropriate side rails in place/Call bell, personal items and telephone in reach/Instruct patient to call for assistance before getting out of bed/chair/stretcher/Non-slip footwear applied when patient is off stretcher/[place] to call system/Physically safe environment - no spills, clutter or unnecessary equipment/Purposeful proactive rounding/Room/bathroom lighting operational, light cord in reach

## 2024-06-09 LAB
CULTURE RESULTS: SIGNIFICANT CHANGE UP
CULTURE RESULTS: SIGNIFICANT CHANGE UP
SPECIMEN SOURCE: SIGNIFICANT CHANGE UP
SPECIMEN SOURCE: SIGNIFICANT CHANGE UP

## (undated) DEVICE — IMMOBILIZER KNEE 3 PNL 19 IN